# Patient Record
Sex: FEMALE | Race: BLACK OR AFRICAN AMERICAN | URBAN - METROPOLITAN AREA
[De-identification: names, ages, dates, MRNs, and addresses within clinical notes are randomized per-mention and may not be internally consistent; named-entity substitution may affect disease eponyms.]

---

## 2022-08-23 ENCOUNTER — HOSPITAL ENCOUNTER (EMERGENCY)
Age: 37
Discharge: HOME | End: 2022-08-23
Payer: COMMERCIAL

## 2022-08-23 ENCOUNTER — APPOINTMENT (OUTPATIENT)
Dept: GENERAL RADIOLOGY | Age: 37
End: 2022-08-23
Payer: COMMERCIAL

## 2022-08-23 VITALS
TEMPERATURE: 99.2 F | DIASTOLIC BLOOD PRESSURE: 73 MMHG | BODY MASS INDEX: 45.58 KG/M2 | RESPIRATION RATE: 19 BRPM | WEIGHT: 291 LBS | SYSTOLIC BLOOD PRESSURE: 135 MMHG | HEART RATE: 81 BPM | OXYGEN SATURATION: 98 %

## 2022-08-23 DIAGNOSIS — M94.0 COSTOCHONDRITIS: Primary | ICD-10-CM

## 2022-08-23 PROBLEM — I10 PRIMARY HYPERTENSION: Status: ACTIVE | Noted: 2022-06-28

## 2022-08-23 PROBLEM — D50.0 IRON DEFICIENCY ANEMIA DUE TO CHRONIC BLOOD LOSS: Status: ACTIVE | Noted: 2021-01-28

## 2022-08-23 LAB
A/G RATIO: 0.9 (ref 1.1–2.2)
ALBUMIN SERPL-MCNC: 3.7 G/DL (ref 3.4–5)
ALP BLD-CCNC: 56 U/L (ref 40–129)
ALT SERPL-CCNC: 7 U/L (ref 10–40)
ANION GAP SERPL CALCULATED.3IONS-SCNC: 9 MMOL/L (ref 3–16)
ANISOCYTOSIS: ABNORMAL
AST SERPL-CCNC: 10 U/L (ref 15–37)
BASOPHILS ABSOLUTE: 0.1 K/UL (ref 0–0.2)
BASOPHILS RELATIVE PERCENT: 0.8 %
BILIRUB SERPL-MCNC: <0.2 MG/DL (ref 0–1)
BUN BLDV-MCNC: 12 MG/DL (ref 7–20)
CALCIUM SERPL-MCNC: 8.8 MG/DL (ref 8.3–10.6)
CHLORIDE BLD-SCNC: 106 MMOL/L (ref 99–110)
CO2: 23 MMOL/L (ref 21–32)
CREAT SERPL-MCNC: 0.7 MG/DL (ref 0.6–1.1)
EOSINOPHILS ABSOLUTE: 1 K/UL (ref 0–0.6)
EOSINOPHILS RELATIVE PERCENT: 6.5 %
GFR AFRICAN AMERICAN: >60
GFR NON-AFRICAN AMERICAN: >60
GLUCOSE BLD-MCNC: 103 MG/DL (ref 70–99)
HCT VFR BLD CALC: 29.4 % (ref 36–48)
HEMATOLOGY PATH CONSULT: YES
HEMOGLOBIN: 8.8 G/DL (ref 12–16)
LYMPHOCYTES ABSOLUTE: 3.3 K/UL (ref 1–5.1)
LYMPHOCYTES RELATIVE PERCENT: 20.8 %
MCH RBC QN AUTO: 20.7 PG (ref 26–34)
MCHC RBC AUTO-ENTMCNC: 30 G/DL (ref 31–36)
MCV RBC AUTO: 68.9 FL (ref 80–100)
MONOCYTES ABSOLUTE: 1.3 K/UL (ref 0–1.3)
MONOCYTES RELATIVE PERCENT: 7.8 %
NEUTROPHILS ABSOLUTE: 10.3 K/UL (ref 1.7–7.7)
NEUTROPHILS RELATIVE PERCENT: 64.1 %
OVALOCYTES: ABNORMAL
PDW BLD-RTO: 18.4 % (ref 12.4–15.4)
PLATELET # BLD: 531 K/UL (ref 135–450)
PLATELET SLIDE REVIEW: ADEQUATE
PMV BLD AUTO: 7.3 FL (ref 5–10.5)
POLYCHROMASIA: ABNORMAL
POTASSIUM SERPL-SCNC: 4 MMOL/L (ref 3.5–5.1)
RBC # BLD: 4.26 M/UL (ref 4–5.2)
SLIDE REVIEW: ABNORMAL
SODIUM BLD-SCNC: 138 MMOL/L (ref 136–145)
TARGET CELLS: ABNORMAL
TEAR DROP CELLS: ABNORMAL
TOTAL PROTEIN: 7.9 G/DL (ref 6.4–8.2)
TROPONIN: <0.01 NG/ML
WBC # BLD: 16.1 K/UL (ref 4–11)

## 2022-08-23 PROCEDURE — 84484 ASSAY OF TROPONIN QUANT: CPT

## 2022-08-23 PROCEDURE — 71046 X-RAY EXAM CHEST 2 VIEWS: CPT

## 2022-08-23 PROCEDURE — 93005 ELECTROCARDIOGRAM TRACING: CPT | Performed by: EMERGENCY MEDICINE

## 2022-08-23 PROCEDURE — 85025 COMPLETE CBC W/AUTO DIFF WBC: CPT

## 2022-08-23 PROCEDURE — 99285 EMERGENCY DEPT VISIT HI MDM: CPT

## 2022-08-23 PROCEDURE — 80053 COMPREHEN METABOLIC PANEL: CPT

## 2022-08-23 RX ORDER — LORAZEPAM 0.5 MG/1
TABLET ORAL
COMMUNITY

## 2022-08-23 RX ORDER — IXEKIZUMAB 80 MG/ML
INJECTION, SOLUTION SUBCUTANEOUS
COMMUNITY
Start: 2022-07-18

## 2022-08-23 RX ORDER — NAPROXEN 375 MG/1
375 TABLET ORAL 2 TIMES DAILY
Qty: 20 TABLET | Refills: 0 | Status: SHIPPED | OUTPATIENT
Start: 2022-08-23

## 2022-08-23 RX ORDER — AMLODIPINE BESYLATE 10 MG/1
10 TABLET ORAL DAILY
COMMUNITY
Start: 2022-08-18

## 2022-08-23 ASSESSMENT — ENCOUNTER SYMPTOMS
NAUSEA: 0
DIARRHEA: 0
EYE PAIN: 0
VOMITING: 0
CONSTIPATION: 0
BACK PAIN: 0
RHINORRHEA: 0
SORE THROAT: 0
ABDOMINAL PAIN: 0
SHORTNESS OF BREATH: 0
COUGH: 0

## 2022-08-23 ASSESSMENT — PAIN - FUNCTIONAL ASSESSMENT: PAIN_FUNCTIONAL_ASSESSMENT: 0-10

## 2022-08-23 ASSESSMENT — PAIN SCALES - GENERAL: PAINLEVEL_OUTOF10: 2

## 2022-08-23 ASSESSMENT — HEART SCORE: ECG: 0

## 2022-08-23 NOTE — Clinical Note
Zahraa Norton was seen and treated in our emergency department on 8/23/2022. She may return to work on 08/25/2022. If you have any questions or concerns, please don't hesitate to call.       ADILENE Rivera

## 2022-08-23 NOTE — DISCHARGE INSTRUCTIONS
Follow-up with primary care doctor in 1 week as discussed. Return to emergency room develop fevers, chills, nausea, vomiting, abdominal pain or worsening chest pain. Take naproxen medication for pain as needed and modify your activities to help with your pain.

## 2022-08-23 NOTE — ED PROVIDER NOTES
EKG is reviewed myself. Dated today [facility].   Rate 101 sinus tach otherwise normal.     Sonya Laird MD  08/23/22 0124

## 2022-08-23 NOTE — ED PROVIDER NOTES
[facility]        Pt Name: Jaquan Bhatt  MRN: [id number]  [place] 1985  Date of evaluation: 8/23/2022  Provider: ADILENE Temple  PCP: No primary care provider on file. Note Started: 4:17 PM EDT       JIN. I have evaluated this patient. My supervising physician was available for consultation. CHIEF COMPLAINT       Chief Complaint   Patient presents with    Chest Pain     Pt reports chest pain onset yesterday morning. States it \"feels like a pulled muscle\", denies cardiac hx        HISTORY OF PRESENT ILLNESS   (Location, Timing/Onset, Context/Setting, Quality, Duration, Modifying Factors, Severity, Associated Signs and Symptoms)  Note limiting factors. Chief Complaint: Chest pain    Jaquan Bhatt is a 40 y.o. female who presents to the Emergency Department due to left-sided chest pain. Patient states that started yesterday. Patient states that she feels like it is a pulled muscle. Patient states that whenever she twists or reaches across her body she feels the pain in the left side of her chest.  Patient does not have chest pain at rest.  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  Patient does have history of anxiety and is concerned that maybe she has something wrong with her heart. Patient states that her brother passed away at the age of 29 from a myocardial infarction. Patient denies any shortness of breath or difficulty breathing. Patient has any fevers or chills. Patient denies any nausea vomiting or abdominal pain with this. Patient has not taken any over-the-counter medications for her chest pain. Nursing Notes were all reviewed and agreed with or any disagreements were addressed in the HPI.     REVIEW OF SYSTEMS    (2-9 systems for level 4, 10 or more for level 5)     Review of Systems   Constitutional: Negative for chills, diaphoresis and fever. HENT:  Negative for congestion, rhinorrhea and sore throat. Eyes:  Negative for pain and visual disturbance. Respiratory:  Negative for cough and shortness of breath. Cardiovascular:  Positive for chest pain. Negative for leg swelling. Gastrointestinal:  Negative for abdominal pain, constipation, diarrhea, nausea and vomiting. Genitourinary:  Negative for difficulty urinating, dysuria and frequency. Musculoskeletal:  Negative for back pain and neck pain. Skin:  Negative for rash and wound. Neurological:  Negative for dizziness and light-headedness. Positives and Pertinent negatives as per HPI. Except as noted above in the ROS, all other systems were reviewed and negative. PAST MEDICAL HISTORY   No past medical history on file. SURGICAL HISTORY   No past surgical history on file. [address]       Discharge Medication List as of 8/23/2022  5:13 PM        CONTINUE these medications which have NOT CHANGED    Details   amLODIPine (NORVASC) 10 MG tablet Take 10 mg by mouth dailyHistorical Med      ixekizumab (TALTZ) 80 MG/ML SOAJ prefilled syringe Taltz Autoinjector 80 mg/mL subcutaneousHistorical Med      cyanocobalamin ([employer] VITAMIN B12) 1000 MCG tablet Take by mouthHistorical Med      LORazepam (ATIVAN) 0.5 MG tablet lorazepam 0.5 mg tabletHistorical Med               ALLERGIES     Patient has no known allergies. FAMILYHISTORY     No family history on file.        SOCIAL HISTORY       Social History     Tobacco Use    Smoking status: Former   Substance Use Topics    Alcohol use: Yes    Drug use: No       SCREENINGS      Heart Score for chest pain patients  History: Slightly Suspicious  ECG: Normal  Patient Age: < 45 years  *Risk factors for Atherosclerotic disease: Obesity, Hypertension  Risk Factors: 1 or 2 risk factors  Troponin: < 1X normal limit  Heart Score Total: 1      PHYSICAL EXAM    (up to 7 for level 4, 8 or more for level 5)     ED Triage Vitals [08/23/22 1539]   BP Temp Temp Source Heart Rate Resp SpO2 Height Weight   (!) 175/95 99.2 °F (37.3 °C) Oral 99 19 98 % -- 291 lb (132 kg)       Physical Exam  Constitutional:       General: She is not in acute distress. Appearance: She is normal weight. She is not ill-appearing. Cardiovascular:      Rate and Rhythm: Normal rate and regular rhythm. Pulses: Normal pulses. Heart sounds: Normal heart sounds. Pulmonary:      Effort: Pulmonary effort is normal.      Breath sounds: Normal breath sounds. Chest:      Chest wall: Tenderness present. Comments: Mild tenderness to palpation over the left side of patient's chest.  Musculoskeletal:      Cervical back: Normal range of motion and neck supple. Right lower leg: No edema. Left lower leg: No edema. Skin:     Comments: Psoriatic plaques on the arms and lower legs. Neurological:      Mental Status: She is alert and oriented to person, place, and time. Psychiatric:         Mood and Affect: Mood normal.         Behavior: Behavior normal.       DIAGNOSTIC RESULTS   LABS:    Labs Reviewed   COMPREHENSIVE METABOLIC PANEL - Abnormal; Notable for the following components:       Result Value    Glucose 103 (*)     Albumin/Globulin Ratio 0.9 (*)     ALT 7 (*)     AST 10 (*)     All other components within normal limits   CBC WITH AUTO DIFFERENTIAL - Abnormal; Notable for the following components:    WBC 16.1 (*)     Hemoglobin 8.8 (*)     Hematocrit 29.4 (*)     MCH 20.7 (*)     MCHC 30.0 (*)     RDW 18.4 (*)     Platelets 808 (*)     Neutrophils Absolute 10.3 (*)     Eosinophils Absolute 1.0 (*)     All other components within normal limits   TROPONIN       When ordered only abnormal lab results are displayed. All other labs were within normal range or not returned as of this dictation. EKG:  When ordered, EKG's are interpreted by the Emergency Department Physician in the absence of a cardiologist.  Please see their note for interpretation of EKG. RADIOLOGY:   Non-plain film images such as CT, Ultrasound and MRI are read by the radiologist. Plain radiographic images are visualized and preliminarily interpreted by the ED Provider with the below findings:        Interpretation per the Radiologist below, if available at the time of this note:    XR CHEST (2 VW)   Final Result   Clear lungs. No results found. PROCEDURES   Unless otherwise noted below, none     Procedures    CRITICAL CARE TIME       CONSULTS:  None      EMERGENCY DEPARTMENT COURSE and DIFFERENTIAL DIAGNOSIS/MDM:   Vitals:    Vitals:    08/23/22 1539 08/23/22 1545 08/23/22 1619   BP: (!) 175/95 135/73    Pulse: 99  81   Resp: 19     Temp: 99.2 °F (37.3 °C)     TempSrc: Oral     SpO2: 98%     Weight: 291 lb (132 kg)         Patient was given the following medications:  Medications - No data to display      Is this patient to be included in the SEP-1 Core Measure due to severe sepsis or septic shock? No   Exclusion criteria - the patient is NOT to be included for SEP-1 Core Measure due to:  2+ SIRS criteria are not met    77-year-old female presents emergency department due to left-sided chest pain that is worse with movement and thinks that she has a muscle strain. 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 Patient states that she also recently returned back to the gym has been working out. On exam patient does have mild tenderness over the left side of the chest with palpation. Patient's heart and lung exams were normal.  Patient's legs were not swollen and did not have any calf tenderness. Patient does have psoriatic rash throughout the body and has been taking Toltz monoclonal antibody treatment for this but has not been with it for over 2 weeks. Due to the patient's symptoms of chest pain chest x-ray was ordered did not show any acute abnormalities.   Troponin was normal.  EKG showed sinus tachycardia. CBC showed elevated white count but this may be due to secondary discontinuation of Toltz monoclonal antibody treatment for her psoriasis since she has been without it for over 2 weeks which is prescribed for every 2-week treatment. Patient states that she has been having difficulty with getting authorization for these injections due to insurance complications. Patient is not complaining of any urinary symptoms, cough, nausea vomiting or abdominal pain I have a low suspicion for infection at this time. Patient not having fevers or chills. Patient does not have any signs of cellulitis or other skin infections. I completed a HEART Score to screen for Major Adverse Cardiac Event (MACE) in this patient. The evidence indicates that  the patient is low risk for MACE and this is consistent with my clinical intuition. The risk of further workup or hospitalization for MACE is likely higher than the risk of the patient having a MACE. It is,  therefore, in the patients best interest not to do additional emergent testing or to be hospitalized for MACE. I have discussed with the patient my clinical impression and the result of the HEART Score to screen for MACE, as well as the  risks of further testing and hospitalization. The HEART Score shows that the risk for MACE is less than 2%     FINAL IMPRESSION      1. Costochondritis          DISPOSITION/PLAN   DISPOSITION Decision To Discharge 08/23/2022 05:07:19 PM      PATIENT REFERRED TO:  No follow-up provider specified.     DISCHARGE MEDICATIONS:  Discharge Medication List as of 8/23/2022  5:13 PM          DISCONTINUED MEDICATIONS:  Discharge Medication List as of 8/23/2022  5:13 PM                 (Please note that portions of this note were completed with a voice recognition program.  Efforts were made to edit the dictations but occasionally words are mis-transcribed.)    ADILENE Hammer (electronically signed)            Germaine Bledsoe PA  08/23/22 6442

## 2022-08-24 LAB
EKG ATRIAL RATE: 101 BPM
EKG DIAGNOSIS: NORMAL
EKG P AXIS: 57 DEGREES
EKG P-R INTERVAL: 130 MS
EKG Q-T INTERVAL: 336 MS
EKG QRS DURATION: 90 MS
EKG QTC CALCULATION (BAZETT): 435 MS
EKG R AXIS: 26 DEGREES
EKG T AXIS: 42 DEGREES
EKG VENTRICULAR RATE: 101 BPM
HEMATOLOGY PATH CONSULT: NORMAL

## 2022-08-24 PROCEDURE — 93010 ELECTROCARDIOGRAM REPORT: CPT | Performed by: INTERNAL MEDICINE
